# Patient Record
Sex: FEMALE | Race: WHITE | NOT HISPANIC OR LATINO | URBAN - METROPOLITAN AREA
[De-identification: names, ages, dates, MRNs, and addresses within clinical notes are randomized per-mention and may not be internally consistent; named-entity substitution may affect disease eponyms.]

---

## 2018-12-20 ENCOUNTER — IMPORTED ENCOUNTER (OUTPATIENT)
Dept: URBAN - METROPOLITAN AREA CLINIC 31 | Facility: CLINIC | Age: 63
End: 2018-12-20

## 2018-12-20 PROBLEM — H25.13: Noted: 2018-12-20

## 2018-12-20 PROCEDURE — 92004 COMPRE OPH EXAM NEW PT 1/>: CPT

## 2018-12-20 PROCEDURE — 92015 DETERMINE REFRACTIVE STATE: CPT

## 2018-12-20 NOTE — PATIENT DISCUSSION
Discussed the risks benefits alternatives and limitations of cataract surgery including infection bleeding loss of vision retinal tears detachment. The patient stated a full understanding and a desire to proceed with the procedure in both eyes. Refractive options were reviewed. Patient has elected to be optimized for distance vision in both eyes. The patient will still need glasses for reading and to possibly fine tune distance vision. Patient wants to try glasses first. Final glasses given today. Patient can call to schedule Cataract Surgery if desires. OD/OS . RTC 6 mos CTEV if no surgery. Return for an appointment in 6 months for cataract evaluation. with Dr. Ruddy Chávez.

## 2020-10-01 ENCOUNTER — OFFICE VISIT (OUTPATIENT)
Age: 65
End: 2020-10-01

## 2021-01-05 ENCOUNTER — OFFICE VISIT (OUTPATIENT)
Dept: URBAN - METROPOLITAN AREA CLINIC 63 | Facility: CLINIC | Age: 66
End: 2021-01-05

## 2021-01-12 ENCOUNTER — TELEPHONE ENCOUNTER (OUTPATIENT)
Dept: URBAN - METROPOLITAN AREA CLINIC 9 | Facility: CLINIC | Age: 66
End: 2021-01-12

## 2021-01-12 ENCOUNTER — OFFICE VISIT (OUTPATIENT)
Dept: URBAN - METROPOLITAN AREA CLINIC 9 | Facility: CLINIC | Age: 66
End: 2021-01-12

## 2021-01-20 ENCOUNTER — OFFICE VISIT (OUTPATIENT)
Dept: URBAN - METROPOLITAN AREA CLINIC 60 | Facility: CLINIC | Age: 66
End: 2021-01-20

## 2021-01-25 ENCOUNTER — OFFICE VISIT (OUTPATIENT)
Dept: URBAN - METROPOLITAN AREA SURGERY CENTER 9 | Facility: SURGERY CENTER | Age: 66
End: 2021-01-25

## 2021-03-01 ENCOUNTER — OFFICE VISIT (OUTPATIENT)
Dept: URBAN - METROPOLITAN AREA CLINIC 9 | Facility: CLINIC | Age: 66
End: 2021-03-01

## 2021-12-01 ENCOUNTER — OFFICE VISIT (OUTPATIENT)
Dept: URBAN - METROPOLITAN AREA CLINIC 9 | Facility: CLINIC | Age: 66
End: 2021-12-01

## 2022-04-01 ASSESSMENT — VISUAL ACUITY
OD_SC: 20/25
OS_PH: SC 20/25
OD_PH: SC 20/40
OD_GLARE: 20/60MED
OS_CC: 20/50-2
OD_CC: 20/80-2
OS_GLARE: 20/50MED
OU_SC: 20/2516''
OS_SC: 20/30

## 2022-04-01 ASSESSMENT — TONOMETRY
OD_IOP_MMHG: 14
OS_IOP_MMHG: 15

## 2022-07-09 ENCOUNTER — TELEPHONE ENCOUNTER (OUTPATIENT)
Dept: URBAN - METROPOLITAN AREA CLINIC 121 | Facility: CLINIC | Age: 67
End: 2022-07-09

## 2022-07-10 ENCOUNTER — TELEPHONE ENCOUNTER (OUTPATIENT)
Dept: URBAN - METROPOLITAN AREA CLINIC 121 | Facility: CLINIC | Age: 67
End: 2022-07-10

## 2022-07-30 ENCOUNTER — TELEPHONE ENCOUNTER (OUTPATIENT)
Age: 67
End: 2022-07-30

## 2022-07-30 RX ORDER — PANTOPRAZOLE 20 MG/1
1 (ONE) TABLET, DELAYED RELEASE ORAL DAILY
Qty: 0 | Refills: 3 | OUTPATIENT
Start: 2021-01-12 | End: 2021-12-01

## 2022-07-30 RX ORDER — SODIUM SULFATE, POTASSIUM SULFATE, MAGNESIUM SULFATE 17.5; 3.13; 1.6 G/ML; G/ML; G/ML
1 (ONE) SOLUTION, CONCENTRATE ORAL
Qty: 0 | Refills: 2 | OUTPATIENT
Start: 2021-01-12 | End: 2021-01-13

## 2022-07-31 ENCOUNTER — TELEPHONE ENCOUNTER (OUTPATIENT)
Age: 67
End: 2022-07-31

## 2022-07-31 RX ORDER — PANTOPRAZOLE 20 MG/1
1 (ONE) TABLET, DELAYED RELEASE ORAL DAILY
Qty: 0 | Refills: 3 | Status: ACTIVE | COMMUNITY
Start: 2021-01-12

## 2022-07-31 RX ORDER — SODIUM SULFATE, POTASSIUM SULFATE, MAGNESIUM SULFATE 17.5; 3.13; 1.6 G/ML; G/ML; G/ML
1 (ONE) SOLUTION, CONCENTRATE ORAL
Qty: 0 | Refills: 2 | Status: ACTIVE | COMMUNITY
Start: 2021-01-12

## 2024-03-21 ENCOUNTER — NEW PATIENT (OUTPATIENT)
Facility: LOCATION | Age: 69
End: 2024-03-21

## 2024-03-21 VITALS — BODY MASS INDEX: 28.43 KG/M2 | HEIGHT: 59 IN | WEIGHT: 141 LBS

## 2024-03-21 DIAGNOSIS — H52.4: ICD-10-CM

## 2024-03-21 DIAGNOSIS — H02.831: ICD-10-CM

## 2024-03-21 DIAGNOSIS — H02.834: ICD-10-CM

## 2024-03-21 PROCEDURE — 92015KEC REFRACTION KEC

## 2024-03-21 PROCEDURE — 92014 COMPRE OPH EXAM EST PT 1/>: CPT

## 2024-03-21 ASSESSMENT — VISUAL ACUITY
OS_PH: 20/25
OD_SC: J5
OD_SC: 20/30
OS_SC: J7
OS_SC: 20/40

## 2024-03-21 ASSESSMENT — TONOMETRY
OD_IOP_MMHG: 21
OS_IOP_MMHG: 21